# Patient Record
Sex: FEMALE | Race: WHITE | Employment: UNEMPLOYED | URBAN - METROPOLITAN AREA
[De-identification: names, ages, dates, MRNs, and addresses within clinical notes are randomized per-mention and may not be internally consistent; named-entity substitution may affect disease eponyms.]

---

## 2019-10-03 PROBLEM — M54.41 CHRONIC BILATERAL LOW BACK PAIN WITH RIGHT-SIDED SCIATICA: Status: ACTIVE | Noted: 2019-10-03

## 2019-10-03 PROBLEM — G89.29 CHRONIC BILATERAL LOW BACK PAIN WITH RIGHT-SIDED SCIATICA: Status: ACTIVE | Noted: 2019-10-03

## 2019-10-16 ENCOUNTER — HOSPITAL ENCOUNTER (OUTPATIENT)
Dept: MRI IMAGING | Age: 44
Discharge: HOME | End: 2019-10-16
Attending: FAMILY MEDICINE
Payer: COMMERCIAL

## 2019-10-16 DIAGNOSIS — G89.29 CHRONIC BILATERAL LOW BACK PAIN WITH RIGHT-SIDED SCIATICA: ICD-10-CM

## 2019-10-16 DIAGNOSIS — M54.41 CHRONIC BILATERAL LOW BACK PAIN WITH RIGHT-SIDED SCIATICA: ICD-10-CM

## 2019-10-16 PROCEDURE — 72148 MRI LUMBAR SPINE W/O DYE: CPT

## 2019-10-30 ENCOUNTER — HOSPITAL ENCOUNTER (OUTPATIENT)
Dept: ULTRASOUND IMAGING | Age: 44
Discharge: HOME | End: 2019-10-30
Attending: FAMILY MEDICINE
Payer: COMMERCIAL

## 2019-10-30 DIAGNOSIS — N83.201 CYST OF RIGHT OVARY: ICD-10-CM

## 2019-10-30 PROCEDURE — 76830 TRANSVAGINAL US NON-OB: CPT

## 2021-05-17 ENCOUNTER — HOSPITAL ENCOUNTER (OUTPATIENT)
Dept: SLEEP MEDICINE | Age: 46
Discharge: HOME | End: 2021-05-17
Payer: COMMERCIAL

## 2021-05-17 PROCEDURE — 95806 SLEEP STUDY UNATT&RESP EFFT: CPT

## 2021-06-04 PROBLEM — G47.33 OSA (OBSTRUCTIVE SLEEP APNEA): Status: ACTIVE | Noted: 2021-06-04

## 2022-03-18 PROBLEM — M54.41 CHRONIC BILATERAL LOW BACK PAIN WITH RIGHT-SIDED SCIATICA: Status: ACTIVE | Noted: 2019-10-03

## 2022-03-18 PROBLEM — G89.29 CHRONIC BILATERAL LOW BACK PAIN WITH RIGHT-SIDED SCIATICA: Status: ACTIVE | Noted: 2019-10-03

## 2022-03-19 PROBLEM — G47.33 OSA (OBSTRUCTIVE SLEEP APNEA): Status: ACTIVE | Noted: 2021-06-04

## 2022-11-01 ENCOUNTER — TELEMEDICINE (OUTPATIENT)
Dept: FAMILY MEDICINE CLINIC | Facility: CLINIC | Age: 47
End: 2022-11-01
Payer: COMMERCIAL

## 2022-11-01 DIAGNOSIS — K21.9 GASTROESOPHAGEAL REFLUX DISEASE, UNSPECIFIED WHETHER ESOPHAGITIS PRESENT: ICD-10-CM

## 2022-11-01 DIAGNOSIS — E66.01 SEVERE OBESITY (BMI 35.0-39.9) WITH COMORBIDITY (HCC): ICD-10-CM

## 2022-11-01 DIAGNOSIS — F41.8 SITUATIONAL ANXIETY: ICD-10-CM

## 2022-11-01 DIAGNOSIS — F50.81 BINGE EATING DISORDER: Primary | ICD-10-CM

## 2022-11-01 DIAGNOSIS — R41.840 ATTENTION AND CONCENTRATION DEFICIT: ICD-10-CM

## 2022-11-01 PROBLEM — F41.1 GAD (GENERALIZED ANXIETY DISORDER): Status: ACTIVE | Noted: 2022-11-01

## 2022-11-01 PROBLEM — F41.1 GAD (GENERALIZED ANXIETY DISORDER): Status: RESOLVED | Noted: 2022-11-01 | Resolved: 2022-11-01

## 2022-11-01 PROCEDURE — 99215 OFFICE O/P EST HI 40 MIN: CPT | Performed by: FAMILY MEDICINE

## 2022-11-01 SDOH — ECONOMIC STABILITY: FOOD INSECURITY: WITHIN THE PAST 12 MONTHS, THE FOOD YOU BOUGHT JUST DIDN'T LAST AND YOU DIDN'T HAVE MONEY TO GET MORE.: NEVER TRUE

## 2022-11-01 SDOH — ECONOMIC STABILITY: FOOD INSECURITY: WITHIN THE PAST 12 MONTHS, YOU WORRIED THAT YOUR FOOD WOULD RUN OUT BEFORE YOU GOT MONEY TO BUY MORE.: NEVER TRUE

## 2022-11-01 ASSESSMENT — PATIENT HEALTH QUESTIONNAIRE - PHQ9
SUM OF ALL RESPONSES TO PHQ QUESTIONS 1-9: 0
SUM OF ALL RESPONSES TO PHQ QUESTIONS 1-9: 0
SUM OF ALL RESPONSES TO PHQ9 QUESTIONS 1 & 2: 0
SUM OF ALL RESPONSES TO PHQ QUESTIONS 1-9: 0
2. FEELING DOWN, DEPRESSED OR HOPELESS: 0
1. LITTLE INTEREST OR PLEASURE IN DOING THINGS: 0
SUM OF ALL RESPONSES TO PHQ QUESTIONS 1-9: 0

## 2022-11-01 ASSESSMENT — SOCIAL DETERMINANTS OF HEALTH (SDOH): HOW HARD IS IT FOR YOU TO PAY FOR THE VERY BASICS LIKE FOOD, HOUSING, MEDICAL CARE, AND HEATING?: NOT HARD AT ALL

## 2022-11-01 NOTE — PROGRESS NOTES
Roya  [address] [place] [address]  Phone: [phone]  Fax: [phone]  [employer]@"[employer]      Encounter Zuleika; Established patient 56 y. o.female; seen 11/1/2022 for: Eating Disorder and Weight Gain (Pt is binge eating feels she is self medicating with food and would asst. With weight loss)      Assessment & Plan    1. Binge eating disorder  -     lisdexamfetamine (VYVANSE) 30 MG capsule; Take 1 capsule by mouth daily. , Disp-30 capsule, R-0Normal  -     lisdexamfetamine (VYVANSE) 30 MG capsule; Take 1 capsule by mouth daily. , Disp-30 capsule, R-0Normal  -     lisdexamfetamine (VYVANSE) 30 MG capsule; Take 1 capsule by mouth daily. , Disp-30 capsule, R-0Normal  2. Situational anxiety  3. Gastroesophageal reflux disease, unspecified whether esophagitis present  4. Severe obesity (BMI 35.0-39. 9) with comorbidity ([address]  5. Attention and concentration deficit  -     lisdexamfetamine (VYVANSE) 30 MG capsule; Take 1 capsule by mouth daily. , Disp-30 capsule, R-0Normal  -     lisdexamfetamine (VYVANSE) 30 MG capsule; Take 1 capsule by mouth daily. , Disp-30 capsule, R-0Normal  -     lisdexamfetamine (VYVANSE) 30 MG capsule; Take 1 capsule by mouth daily. , Disp-30 capsule, R-0Normal    Problem and/or Symptoms are currently not stable and/or well controlled on current treatment plan. Will have patient follow up as directed and make the following changes for further evaluation and/or treatment:     Long talk with pt about Dx of ADHD & Binge Eating Disorder, and Tx options for dealing with anxiety and/or other more healthy coping mechanisms. Will try a low/mod dose of Vyvanse since this had FDA approved indication for Tx of Binge Eating Disorder as well as helping with concentration & focus, though again pt does not yet have the formal Dx of ADHD.        Check Out Instructions  Return in about 3 months (around 2/1/2023) for Virtual Visit, With PCP  Star Yoder. Subjective & Objective    HPI  Pt states she was down to 180 earlier this year, but after beginning a recurring cycle of binge eating her weight has increased up to 280 now. States that she will eat to excess even when it's causing pain from her GERD in order to \"feel better\", as a way to cope with stress or anxiety. Pt also states that she feels like she's \"always\" had difficulty with concentration & focus, even from a young age. Her son had ADHD, but pt herself was never formally tested for this. Review of Systems     Physical Exam  No flowsheet data found. BP Readings from Last 3 Encounters:   08/03/21 116/78   04/29/21 135/88     Wt Readings from Last 3 Encounters:   08/03/21 214 lb (97.1 kg)   04/29/21 249 lb (112.9 kg)     There is no height or weight on file to calculate BMI. We discussed the typical prognosis and potential complications of the concern(s), including treatment options. Medication risks, benefits, costs, interactions, and alternatives were discussed as appropriate. I advised her to contact the office if her condition worsens or fails to improve as anticipated. She expressed understanding with the discussion and plan of care. Carina Pierre, was evaluated through a synchronous (real-time) audio and/or video encounter. The patient (or guardian if applicable) is aware that this is a billable service, which includes applicable co-pays. This Virtual Visit was conducted with patient's (and/or legal guardian's) consent. The visit was conducted pursuant to the emergency declaration under the [facility], [address] [place] [address] authority and the [employer] Act. Patient identification was verified, and a caregiver was present when appropriate. The patient was located at Home: [place] [address] [address] [address] [facility] [id number].    Provider was located at [address]): [address] [facility] [address] [place] [address]. An electronic signature was used to authenticate this note.   -- Tasha Hadley MD

## 2023-02-01 ENCOUNTER — TELEMEDICINE (OUTPATIENT)
Dept: FAMILY MEDICINE CLINIC | Facility: CLINIC | Age: 48
End: 2023-02-01
Payer: COMMERCIAL

## 2023-02-01 DIAGNOSIS — R41.840 ATTENTION AND CONCENTRATION DEFICIT: ICD-10-CM

## 2023-02-01 DIAGNOSIS — F50.81 BINGE EATING DISORDER: ICD-10-CM

## 2023-02-01 PROCEDURE — 99214 OFFICE O/P EST MOD 30 MIN: CPT | Performed by: FAMILY MEDICINE

## 2023-02-01 SDOH — ECONOMIC STABILITY: FOOD INSECURITY: WITHIN THE PAST 12 MONTHS, YOU WORRIED THAT YOUR FOOD WOULD RUN OUT BEFORE YOU GOT MONEY TO BUY MORE.: NEVER TRUE

## 2023-02-01 SDOH — ECONOMIC STABILITY: FOOD INSECURITY: WITHIN THE PAST 12 MONTHS, THE FOOD YOU BOUGHT JUST DIDN'T LAST AND YOU DIDN'T HAVE MONEY TO GET MORE.: NEVER TRUE

## 2023-02-01 SDOH — ECONOMIC STABILITY: HOUSING INSECURITY
IN THE LAST 12 MONTHS, WAS THERE A TIME WHEN YOU DID NOT HAVE A STEADY PLACE TO SLEEP OR SLEPT IN A SHELTER (INCLUDING NOW)?: NO

## 2023-02-01 SDOH — ECONOMIC STABILITY: INCOME INSECURITY: HOW HARD IS IT FOR YOU TO PAY FOR THE VERY BASICS LIKE FOOD, HOUSING, MEDICAL CARE, AND HEATING?: NOT HARD AT ALL

## 2023-02-01 ASSESSMENT — PATIENT HEALTH QUESTIONNAIRE - PHQ9
SUM OF ALL RESPONSES TO PHQ QUESTIONS 1-9: 0
SUM OF ALL RESPONSES TO PHQ9 QUESTIONS 1 & 2: 0
1. LITTLE INTEREST OR PLEASURE IN DOING THINGS: 0
SUM OF ALL RESPONSES TO PHQ QUESTIONS 1-9: 0
SUM OF ALL RESPONSES TO PHQ QUESTIONS 1-9: 0
2. FEELING DOWN, DEPRESSED OR HOPELESS: 0
SUM OF ALL RESPONSES TO PHQ QUESTIONS 1-9: 0

## 2023-02-01 NOTE — PROGRESS NOTES
Holley Dias is a 56 y.o. female who was seen by synchronous (real-time) audio-video technology on 2/1/2023. Subjective:   Holley Dias was seen for ADHD  3 mo f/u binge eating d/o  Starting weight 280lbs. Started on vyvanse 30    When she first started the med, was no longer always thinking of food. She does feel that in the last 3 weeks it hasn't been as effective. Can find herself thinking about food more and ate an entire bag of noel kisses yesterday. [facility]. Allergies   Allergen Reactions    Oxycodone Itching         Objective:     General: alert, cooperative, no distress, and morbidly obese   Mental  status: mental status: alert, oriented to person, place, and time, normal mood, behavior, speech, dress, motor activity, and thought processes, laughs when telling me about eating a whole bag of hersheys. Resp: No distress. Neuro: No acute deficits   Skin: skin: no discoloration or lesions of concern on visible areas     Due to this being a TeleHealth evaluation, many elements of the physical examination are unable to be assessed. Assessment & Plan:   Emperatriz Greene was seen today for adhd. Diagnoses and all orders for this visit:    Binge eating disorder  -     Lisdexamfetamine Dimesylate 40 MG CAPS; Take 40 mg by mouth daily for 30 days. Max Daily Amount: 40 mg  -     Lisdexamfetamine Dimesylate 40 MG CAPS; Take 40 mg by mouth daily for 30 days. Max Daily Amount: 40 mg  -     Lisdexamfetamine Dimesylate 40 MG CAPS; Take 40 mg by mouth daily for 30 days. Max Daily Amount: 40 mg    Attention and concentration deficit  -     Lisdexamfetamine Dimesylate 40 MG CAPS; Take 40 mg by mouth daily for 30 days. Max Daily Amount: 40 mg  -     Lisdexamfetamine Dimesylate 40 MG CAPS; Take 40 mg by mouth daily for 30 days. Max Daily Amount: 40 mg  -     Lisdexamfetamine Dimesylate 40 MG CAPS; Take 40 mg by mouth daily for 30 days.  Max Daily Amount: 40 mg  Not controlled. Increase to 40mg. Recc finding a counselor that fits her budget as I think she needs both meds and counseling for long term tretment of this. F/u 3 mos sooner prn          CPT Codes 76864-50701 for Established Patients may apply to this Telehealth Visit    Yanira Parker was evaluated through a synchronous (real-time) audio-video encounter. The patient (or guardian if applicable) is aware that this is a billable service, which includes applicable co-pays. This Virtual Visit was conducted with patient's (and/or leg guardian's) consent. The visit was conducted pursuant to the emergency declaration under the [facility], [address] waiver authority and the [employer] General Act. Patient identification was verified, and a caregiver was present when appropriate. The patient was located in a state where the provider was licensed to provide care. I was at home while conducting this encounter. Pt was at home. We discussed the expected course, resolution and complications of the diagnosis(es) in detail. Medication risks, benefits, costs, interactions, and alternatives were discussed as indicated. I advised her to contact the office if her condition worsens, changes or fails to improve as anticipated. She expressed understanding with the diagnosis(es) and plan.      Drake Dickens, DO

## 2023-05-05 ENCOUNTER — TELEMEDICINE (OUTPATIENT)
Dept: FAMILY MEDICINE CLINIC | Facility: CLINIC | Age: 48
End: 2023-05-05

## 2023-05-05 DIAGNOSIS — F50.81 BINGE EATING DISORDER: ICD-10-CM

## 2023-05-05 DIAGNOSIS — R41.840 ATTENTION AND CONCENTRATION DEFICIT: ICD-10-CM

## 2023-05-05 DIAGNOSIS — Z12.11 COLON CANCER SCREENING: Primary | ICD-10-CM

## 2023-05-05 ASSESSMENT — PATIENT HEALTH QUESTIONNAIRE - PHQ9
1. LITTLE INTEREST OR PLEASURE IN DOING THINGS: 0
SUM OF ALL RESPONSES TO PHQ QUESTIONS 1-9: 0
SUM OF ALL RESPONSES TO PHQ9 QUESTIONS 1 & 2: 0
2. FEELING DOWN, DEPRESSED OR HOPELESS: 0

## 2023-06-09 ENCOUNTER — TELEMEDICINE (OUTPATIENT)
Dept: FAMILY MEDICINE CLINIC | Facility: CLINIC | Age: 48
End: 2023-06-09
Payer: COMMERCIAL

## 2023-06-09 DIAGNOSIS — R41.840 ATTENTION AND CONCENTRATION DEFICIT: ICD-10-CM

## 2023-06-09 DIAGNOSIS — F50.81 BINGE EATING DISORDER: ICD-10-CM

## 2023-06-09 PROCEDURE — 99214 OFFICE O/P EST MOD 30 MIN: CPT | Performed by: FAMILY MEDICINE

## 2023-06-09 ASSESSMENT — PATIENT HEALTH QUESTIONNAIRE - PHQ9
SUM OF ALL RESPONSES TO PHQ QUESTIONS 1-9: 0
2. FEELING DOWN, DEPRESSED OR HOPELESS: 0
SUM OF ALL RESPONSES TO PHQ9 QUESTIONS 1 & 2: 0
1. LITTLE INTEREST OR PLEASURE IN DOING THINGS: 0
SUM OF ALL RESPONSES TO PHQ QUESTIONS 1-9: 0

## 2023-08-14 ASSESSMENT — PATIENT HEALTH QUESTIONNAIRE - PHQ9
2. FEELING DOWN, DEPRESSED OR HOPELESS: NOT AT ALL
SUM OF ALL RESPONSES TO PHQ QUESTIONS 1-9: 0
SUM OF ALL RESPONSES TO PHQ QUESTIONS 1-9: 0
SUM OF ALL RESPONSES TO PHQ9 QUESTIONS 1 & 2: 0
2. FEELING DOWN, DEPRESSED OR HOPELESS: 0
SUM OF ALL RESPONSES TO PHQ QUESTIONS 1-9: 0
SUM OF ALL RESPONSES TO PHQ QUESTIONS 1-9: 0
1. LITTLE INTEREST OR PLEASURE IN DOING THINGS: 0
SUM OF ALL RESPONSES TO PHQ9 QUESTIONS 1 & 2: 0
1. LITTLE INTEREST OR PLEASURE IN DOING THINGS: NOT AT ALL

## 2023-08-16 ENCOUNTER — OFFICE VISIT (OUTPATIENT)
Dept: FAMILY MEDICINE CLINIC | Facility: CLINIC | Age: 48
End: 2023-08-16
Payer: COMMERCIAL

## 2023-08-16 VITALS
SYSTOLIC BLOOD PRESSURE: 131 MMHG | WEIGHT: 188 LBS | TEMPERATURE: 97.3 F | HEART RATE: 93 BPM | BODY MASS INDEX: 31.32 KG/M2 | DIASTOLIC BLOOD PRESSURE: 88 MMHG | OXYGEN SATURATION: 98 % | RESPIRATION RATE: 19 BRPM | HEIGHT: 65 IN

## 2023-08-16 DIAGNOSIS — N76.0 BACTERIAL VAGINOSIS: ICD-10-CM

## 2023-08-16 DIAGNOSIS — F31.9 BIPOLAR 1 DISORDER (HCC): ICD-10-CM

## 2023-08-16 DIAGNOSIS — R82.90 ABNORMAL URINE ODOR: ICD-10-CM

## 2023-08-16 DIAGNOSIS — F50.81 BINGE EATING DISORDER: Primary | ICD-10-CM

## 2023-08-16 DIAGNOSIS — Z72.53 HIGH RISK BISEXUAL BEHAVIOR: ICD-10-CM

## 2023-08-16 DIAGNOSIS — B96.89 BACTERIAL VAGINOSIS: ICD-10-CM

## 2023-08-16 LAB
BILIRUBIN, URINE, POC: NEGATIVE
BLOOD URINE, POC: NEGATIVE
GLUCOSE URINE, POC: NEGATIVE
KETONES, URINE, POC: NEGATIVE
LEUKOCYTE ESTERASE, URINE, POC: NEGATIVE
NITRITE, URINE, POC: NEGATIVE
PH, URINE, POC: 6.5 (ref 4.6–8)
PROTEIN,URINE, POC: NEGATIVE
SPECIFIC GRAVITY, URINE, POC: 1.02 (ref 1–1.03)
URINALYSIS CLARITY, POC: NORMAL
URINALYSIS COLOR, POC: YELLOW
UROBILINOGEN, POC: NORMAL

## 2023-08-16 PROCEDURE — 99214 OFFICE O/P EST MOD 30 MIN: CPT | Performed by: FAMILY MEDICINE

## 2023-08-16 PROCEDURE — 81003 URINALYSIS AUTO W/O SCOPE: CPT | Performed by: FAMILY MEDICINE

## 2023-08-16 ASSESSMENT — ENCOUNTER SYMPTOMS
CONSTIPATION: 0
SHORTNESS OF BREATH: 0
DIARRHEA: 0
COUGH: 0
VOMITING: 0

## 2023-08-16 NOTE — PROGRESS NOTES
Jason Edmondson (:  1975) is a 53 y.o. female,Established patient, here for evaluation of the following chief complaint(s):  ADHD (Wants to see about doing a well visit as well. Does want to talk about what she is taking.) and Other (Has yet to have a Colonoscopy done. Has been over 10 years since her last Tdap shot.)         ASSESSMENT/PLAN:  1. Binge eating disorder  -     Lisdexamfetamine Dimesylate (VYVANSE) 20 MG CAPS; Take 1 capsule by mouth daily for 30 days. Max Daily Amount: 20 mg, Disp-30 capsule, R-0Normal  2. Bipolar 1 disorder (HCC)  -     cariprazine hcl (VRAYLAR) 1.5 MG capsule; Take 1 capsule by mouth daily, Disp-30 capsule, R-1Normal  Decrease vyvanse w/ goal of stopping. Adding vraylar to help w/ bipolar d/o. Will likely need to see a psychiatrist.   High risk std labs done today. Encouraged safe sex practices including the use of condoms always. No follow-ups on file. Subjective   SUBJECTIVE/OBJECTIVE:  HPI  Chief Complaint   Patient presents with    ADHD     Wants to see about doing a well visit as well. Does want to talk about what she is taking. Other     Has yet to have a Colonoscopy done. Has been over 10 years since her last Tdap shot. F/U ADD - states current regimen less effective w/o adverse effects. Needs med(s) refilled. Currently on vyvanse 40mg. Feeling more distracted. Spending more money and overeating, having more sex with more people and not using protection. This has been since her divorce. Has been told in the past her mom has bipolar d/o and possibly borderline personality disorder. She feels like she has a normal mood most of the time, then elevated mood w/ increased risks. Has a counselor but hasn't seen them in a long time. Also has binge eating d/o. Has been told that she may have generalized anxiety disorder but when put on SSRIs, she states she felt worse and had more panic attacks. Would hear footsteps and feel panicked.

## 2023-08-17 DIAGNOSIS — Z72.53 HIGH RISK BISEXUAL BEHAVIOR: ICD-10-CM

## 2023-08-22 ENCOUNTER — TELEPHONE (OUTPATIENT)
Dept: FAMILY MEDICINE CLINIC | Facility: CLINIC | Age: 48
End: 2023-08-22

## 2023-08-22 LAB
A VAGINAE DNA VAG QL NAA+PROBE: ABNORMAL SCORE
BVAB2 DNA VAG QL NAA+PROBE: ABNORMAL SCORE
C ALBICANS DNA VAG QL NAA+PROBE: NEGATIVE
C GLABRATA DNA VAG QL NAA+PROBE: NEGATIVE
C TRACH RRNA SPEC QL NAA+PROBE: NEGATIVE
HSV1 DNA SPEC QL NAA+PROBE: NEGATIVE
HSV2 DNA SPEC QL NAA+PROBE: NEGATIVE
MEGA1 DNA VAG QL NAA+PROBE: ABNORMAL SCORE
N GONORRHOEA RRNA SPEC QL NAA+PROBE: NEGATIVE
SPECIMEN SOURCE: ABNORMAL
T VAGINALIS RRNA SPEC QL NAA+PROBE: NEGATIVE

## 2023-08-22 RX ORDER — METRONIDAZOLE 500 MG/1
500 TABLET ORAL 2 TIMES DAILY
Qty: 14 TABLET | Refills: 0 | Status: SHIPPED | OUTPATIENT
Start: 2023-08-22 | End: 2023-08-29

## 2023-08-22 NOTE — TELEPHONE ENCOUNTER
----- Message from Lien Corona MD sent at 8/22/2023 10:51 AM EDT -----  Your testing showed an overgrowth of bacteria in your vaginal area. This is not a sexually transmitted infection. I've sent in flagyl which is an antibiotic to help normalize the levels of bacteria normally present in this area.

## 2023-09-04 ENCOUNTER — PATIENT MESSAGE (OUTPATIENT)
Dept: FAMILY MEDICINE CLINIC | Facility: CLINIC | Age: 48
End: 2023-09-04

## 2023-09-04 DIAGNOSIS — F31.9 BIPOLAR 1 DISORDER (HCC): ICD-10-CM

## 2023-09-04 DIAGNOSIS — F50.81 BINGE EATING DISORDER: ICD-10-CM

## 2023-09-04 DIAGNOSIS — G47.00 INSOMNIA, UNSPECIFIED TYPE: Primary | ICD-10-CM

## 2023-09-05 RX ORDER — HYDROXYZINE PAMOATE 25 MG/1
25 CAPSULE ORAL
Qty: 30 CAPSULE | Refills: 2 | Status: SHIPPED | OUTPATIENT
Start: 2023-09-05 | End: 2023-12-04

## 2023-09-05 NOTE — TELEPHONE ENCOUNTER
From: Vickie Hughes  To: Dr. George Aly: 9/4/2023 7:26 AM EDT  Subject: Haven't slept normal since med change    I'm waking up at 430 most mornings and can't fall back asleep. I'm tired, getting less than 5 hours on a good night. I tried going to bed earlier and just woke up at 3 instead. I took time released melatoninin and other stuff that was supposed to help me sleep last night and I've been awake since 2. I really don't want to go down the path of taking more meds(like a sleeping pill) to manage side effects from other meds and end up on 6 different meds like my mom who now can't sleep or have a normal movement without being on medication.

## 2023-09-22 ENCOUNTER — TELEPHONE (OUTPATIENT)
Dept: FAMILY MEDICINE CLINIC | Facility: CLINIC | Age: 48
End: 2023-09-22

## 2023-09-22 NOTE — TELEPHONE ENCOUNTER
----- Message from Lien Corona MD sent at 9/22/2023  9:26 AM EDT -----  Regarding: FW: psychiatry referral check in    ----- Message -----  From: Hemant Mancera MA  Sent: 9/21/2023   7:47 AM EDT  To: Lien Corona MD  Subject: RE: psychiatry referral check in                 [facility] , If some one could tell her that would be great. We are down 3 team mates, so it's been crazy! Thanks [facility]'s number is [phone]   ----- Message -----  From: Lien Corona MD  Sent: 9/20/2023   5:22 PM EDT  To: Hemant Mancera MA  Subject: RE: psychiatry referral check in                 Thank you! Do you noticfy the patient or shall we?   ----- Message -----  From: Hemant Mancera MA  Sent: 9/20/2023   4:34 PM EDT  To: Lien Corona MD  Subject: RE: psychiatry referral check in                 [facility], I sent it to [address] [place] [address], hopefully they will get her in sooner. Hope  ----- Message -----  From: Lien Corona MD  Sent: 9/20/2023   3:32 PM EDT  To: Hemant Mancera MA  Subject: RE: psychiatry referral check in                 Yes, if you can try to get her in sooner somewhere else, that would be awesome.     ----- Message -----  From: Hemant Mancera MA  Sent: 9/20/2023   7:49 AM EDT  To: Lien Corona MD  Subject: RE: psychiatry referral check in                 [facility], With her insurance I can try other places. [facility], [facility], [facility]. They all take it, and I'm sure they could do a sooner appt. What do you want me to do? Hope      ----- Message -----  From: Lien Corona MD  Sent: 9/19/2023   4:51 PM EDT  To: Hemant Mancera MA  Subject: RE: psychiatry referral check in                 Do you know anywhere that is booking sooner?   I feel like a month more seems not awful.   ----- Message -----  From: Hemant Mancera MA  Sent: 9/19/2023   2:57 PM EDT  To: Lien Corona MD  Subject: RE: psychiatry referral check in

## 2023-09-22 NOTE — TELEPHONE ENCOUNTER
I called the patient, but got her voice mail box. I left her a message letting her know that. I asked her to please call back if there are any questions about this.

## 2023-10-16 DIAGNOSIS — Z00.00 ENCOUNTER FOR ANNUAL PHYSICAL EXAM: Primary | ICD-10-CM

## 2023-10-25 ENCOUNTER — OFFICE VISIT (OUTPATIENT)
Dept: FAMILY MEDICINE CLINIC | Facility: CLINIC | Age: 48
End: 2023-10-25
Payer: COMMERCIAL

## 2023-10-25 VITALS
RESPIRATION RATE: 19 BRPM | TEMPERATURE: 97.7 F | DIASTOLIC BLOOD PRESSURE: 85 MMHG | WEIGHT: 204 LBS | HEART RATE: 90 BPM | HEIGHT: 65 IN | BODY MASS INDEX: 33.99 KG/M2 | SYSTOLIC BLOOD PRESSURE: 125 MMHG | OXYGEN SATURATION: 100 %

## 2023-10-25 DIAGNOSIS — K13.0 ANGULAR CHEILITIS: ICD-10-CM

## 2023-10-25 DIAGNOSIS — D72.9 WHITE BLOOD CELL ABNORMALITY: ICD-10-CM

## 2023-10-25 DIAGNOSIS — Z00.00 ENCOUNTER FOR WELL ADULT EXAM WITHOUT ABNORMAL FINDINGS: Primary | ICD-10-CM

## 2023-10-25 DIAGNOSIS — Z12.31 ENCOUNTER FOR SCREENING MAMMOGRAM FOR MALIGNANT NEOPLASM OF BREAST: ICD-10-CM

## 2023-10-25 DIAGNOSIS — Z12.11 SCREENING FOR COLON CANCER: ICD-10-CM

## 2023-10-25 PROCEDURE — 99396 PREV VISIT EST AGE 40-64: CPT | Performed by: FAMILY MEDICINE

## 2023-10-25 ASSESSMENT — ENCOUNTER SYMPTOMS
VOMITING: 0
DIARRHEA: 0
SHORTNESS OF BREATH: 0
CONSTIPATION: 0
COUGH: 0

## 2023-10-27 ENCOUNTER — TELEPHONE (OUTPATIENT)
Dept: FAMILY MEDICINE CLINIC | Facility: CLINIC | Age: 48
End: 2023-10-27

## 2023-10-27 NOTE — TELEPHONE ENCOUNTER
I called the patient, but got her voice mail box. I left a message giving her the information for the place she was referred to. I asked her to please call back if there are any questions.

## 2023-10-27 NOTE — TELEPHONE ENCOUNTER
----- Message from Maggy Pena MD sent at 10/26/2023  5:30 PM EDT -----  Regarding: FW: status update  Please notify patient  ----- Message -----  From: Mason Durham  Sent: 10/26/2023  11:09 AM EDT  To: Maggy Pena MD  Subject: RE: status update                                This referral was sent to [facility]. If she would like she can call them to schedule at [phone].    ----- Message -----  From: Maggy Pena MD  Sent: 10/25/2023   4:26 PM EDT  To: Mason Durham  Subject: status update                                    She reports she is not sure but could have missed a call about psych referral.  Would like to get an update. She reports that she is ok w/ receiving text from referral office if needed.